# Patient Record
Sex: MALE | Race: WHITE | NOT HISPANIC OR LATINO | Employment: FULL TIME | URBAN - METROPOLITAN AREA
[De-identification: names, ages, dates, MRNs, and addresses within clinical notes are randomized per-mention and may not be internally consistent; named-entity substitution may affect disease eponyms.]

---

## 2017-06-10 ENCOUNTER — OFFICE VISIT (OUTPATIENT)
Dept: RETAIL CLINIC | Facility: CLINIC | Age: 22
End: 2017-06-10

## 2017-06-10 VITALS
RESPIRATION RATE: 18 BRPM | HEART RATE: 80 BPM | OXYGEN SATURATION: 98 % | TEMPERATURE: 98 F | SYSTOLIC BLOOD PRESSURE: 120 MMHG | DIASTOLIC BLOOD PRESSURE: 80 MMHG

## 2017-06-10 DIAGNOSIS — M54.6 RIGHT-SIDED THORACIC BACK PAIN, UNSPECIFIED CHRONICITY: ICD-10-CM

## 2017-06-10 DIAGNOSIS — M54.6 ACUTE RIGHT-SIDED THORACIC BACK PAIN: Primary | ICD-10-CM

## 2017-06-10 PROCEDURE — 99213 OFFICE O/P EST LOW 20 MIN: CPT | Performed by: NURSE PRACTITIONER

## 2017-06-10 RX ORDER — NAPROXEN 500 MG/1
500 TABLET ORAL 2 TIMES DAILY WITH MEALS
Qty: 10 TABLET | Refills: 0 | Status: SHIPPED | OUTPATIENT
Start: 2017-06-10 | End: 2020-10-16 | Stop reason: SDUPTHER

## 2017-06-10 RX ORDER — PREDNISONE 10 MG/1
TABLET ORAL
Qty: 21 TABLET | Refills: 0 | Status: SHIPPED | OUTPATIENT
Start: 2017-06-10 | End: 2020-10-16 | Stop reason: SDUPTHER

## 2017-06-10 NOTE — PATIENT INSTRUCTIONS
Back Pain, Adult  Back pain is very common in adults. The cause of back pain is rarely dangerous and the pain often gets better over time. The cause of your back pain may not be known. Some common causes of back pain include:  · Strain of the muscles or ligaments supporting the spine.  · Wear and tear (degeneration) of the spinal disks.  · Arthritis.  · Direct injury to the back.  For many people, back pain may return. Since back pain is rarely dangerous, most people can learn to manage this condition on their own.  HOME CARE INSTRUCTIONS  Watch your back pain for any changes. The following actions may help to lessen any discomfort you are feeling:  · Remain active. It is stressful on your back to sit or  one place for long periods of time. Do not sit, drive, or  one place for more than 30 minutes at a time. Take short walks on even surfaces as soon as you are able. Try to increase the length of time you walk each day.  · Exercise regularly as directed by your health care provider. Exercise helps your back heal faster. It also helps avoid future injury by keeping your muscles strong and flexible.  · Do not stay in bed. Resting more than 1-2 days can delay your recovery.  · Pay attention to your body when you bend and lift. The most comfortable positions are those that put less stress on your recovering back. Always use proper lifting techniques, including:    Bending your knees.    Keeping the load close to your body.    Avoiding twisting.  · Find a comfortable position to sleep. Use a firm mattress and lie on your side with your knees slightly bent. If you lie on your back, put a pillow under your knees.  · Avoid feeling anxious or stressed. Stress increases muscle tension and can worsen back pain. It is important to recognize when you are anxious or stressed and learn ways to manage it, such as with exercise.  · Take medicines only as directed by your health care provider. Over-the-counter  medicines to reduce pain and inflammation are often the most helpful. Your health care provider may prescribe muscle relaxant drugs. These medicines help dull your pain so you can more quickly return to your normal activities and healthy exercise.  · Apply ice to the injured area:    Put ice in a plastic bag.    Place a towel between your skin and the bag.    Leave the ice on for 20 minutes, 2-3 times a day for the first 2-3 days. After that, ice and heat may be alternated to reduce pain and spasms.  · Maintain a healthy weight. Excess weight puts extra stress on your back and makes it difficult to maintain good posture.  SEEK MEDICAL CARE IF:  · You have pain that is not relieved with rest or medicine.  · You have increasing pain going down into the legs or buttocks.  · You have pain that does not improve in one week.  · You have night pain.  · You lose weight.  · You have a fever or chills.  SEEK IMMEDIATE MEDICAL CARE IF:   · You develop new bowel or bladder control problems.  · You have unusual weakness or numbness in your arms or legs.  · You develop nausea or vomiting.  · You develop abdominal pain.  · You feel faint.     This information is not intended to replace advice given to you by your health care provider. Make sure you discuss any questions you have with your health care provider.     Document Released: 12/18/2006 Document Revised: 01/08/2016 Document Reviewed: 04/21/2015  [employer] Patient Education ©2017 [employer]  Talked to the patient about the diagnosis and educate the patient and advise to visit to PCP if the symptoms worsens

## 2017-06-10 NOTE — PROGRESS NOTES
Subjective   Liu Meier is a 22 y.o. male.     Back Pain   This is a new problem. Episode onset: 2 days  The problem occurs constantly. The problem has been gradually worsening since onset. The pain is present in the thoracic spine. The quality of the pain is described as aching and shooting. The pain does not radiate. The pain is at a severity of 7/10. The pain is moderate. The symptoms are aggravated by coughing, twisting and bending. Stiffness is present all day. Pertinent negatives include no bladder incontinence, bowel incontinence, fever, headaches, leg pain, paresthesias, perianal numbness, weakness or weight loss. He has tried NSAIDs for the symptoms. The treatment provided mild relief.        The following portions of the patient's history were reviewed and updated as appropriate: allergies, current medications, past family history, past medical history, past social history, past surgical history and problem list.    Review of Systems   Constitutional: Negative.  Negative for fever and weight loss.   HENT: Negative.    Eyes: Negative.    Respiratory: Negative.    Cardiovascular: Negative.    Gastrointestinal: Negative.  Negative for bowel incontinence.   Genitourinary: Negative for bladder incontinence.   Musculoskeletal: Positive for back pain and myalgias. Negative for gait problem and neck pain.        Right side mid back pain does not radiate   Skin: Negative.    Neurological: Negative for weakness, headaches and paresthesias.       Objective   Physical Exam   Constitutional: He is oriented to person, place, and time. He appears well-developed and well-nourished.   Eyes: Pupils are equal, round, and reactive to light.   Neck: Normal range of motion.   Cardiovascular: Normal rate and regular rhythm.    Pulmonary/Chest: Effort normal and breath sounds normal.   Musculoskeletal: He exhibits edema and tenderness.   Right side mid thoracic tenderness    Neurological: He is oriented to person, place, and  time.   Nursing note and vitals reviewed.      Assessment/Plan   Liu was seen today for back pain.    Diagnoses and all orders for this visit:    Acute right-sided thoracic back pain  -     naproxen (NAPROSYN) 500 MG tablet; Take 1 tablet by mouth 2 (Two) Times a Day With Meals.  -     predniSONE (DELTASONE) 10 MG tablet; 10 mg pack    Right-sided thoracic back pain, unspecified chronicity  -     naproxen (NAPROSYN) 500 MG tablet; Take 1 tablet by mouth 2 (Two) Times a Day With Meals.  -     predniSONE (DELTASONE) 10 MG tablet; 10 mg pack    Talked to the patient about the diagnosis and educate the patient and advise to visit to PCP for the xray and further evaluation if the symptoms worsens

## 2019-10-01 ENCOUNTER — HOSPITAL ENCOUNTER (EMERGENCY)
Facility: HOSPITAL | Age: 24
Discharge: HOME | End: 2019-10-01
Attending: EMERGENCY MEDICINE | Admitting: EMERGENCY MEDICINE

## 2019-10-01 VITALS
TEMPERATURE: 98.3 F | OXYGEN SATURATION: 98 % | WEIGHT: 145.72 LBS | HEART RATE: 89 BPM | DIASTOLIC BLOOD PRESSURE: 72 MMHG | RESPIRATION RATE: 15 BRPM | BODY MASS INDEX: 20.86 KG/M2 | HEIGHT: 70 IN | SYSTOLIC BLOOD PRESSURE: 124 MMHG

## 2019-10-01 DIAGNOSIS — R21 RASH: ICD-10-CM

## 2019-10-01 DIAGNOSIS — R74.8 ABNORMAL LIVER ENZYMES: ICD-10-CM

## 2019-10-01 DIAGNOSIS — B27.90 INFECTIOUS MONONUCLEOSIS WITHOUT COMPLICATION, INFECTIOUS MONONUCLEOSIS DUE TO UNSPECIFIED ORGANISM: Primary | ICD-10-CM

## 2019-10-01 LAB
ALBUMIN SERPL-MCNC: 3.8 G/DL (ref 3.5–4.8)
ALBUMIN/GLOB SERPL: 1.3 G/DL (ref 1–1.7)
ALP SERPL-CCNC: 125 U/L (ref 32–91)
ALT SERPL W P-5'-P-CCNC: 469 U/L (ref 17–63)
ANION GAP SERPL CALCULATED.3IONS-SCNC: 12.6 MMOL/L (ref 5–15)
AST SERPL-CCNC: 173 U/L (ref 15–41)
BASOPHILS # BLD AUTO: 0 10*3/MM3 (ref 0–0.2)
BASOPHILS NFR BLD AUTO: 0.3 % (ref 0–1.5)
BILIRUB SERPL-MCNC: 0.7 MG/DL (ref 0.3–1.2)
BUN BLD-MCNC: 9 MG/DL (ref 8–20)
BUN/CREAT SERPL: 8.2 (ref 6.2–20.3)
CALCIUM SPEC-SCNC: 8.6 MG/DL (ref 8.9–10.3)
CHLORIDE SERPL-SCNC: 103 MMOL/L (ref 101–111)
CO2 SERPL-SCNC: 28 MMOL/L (ref 22–32)
CREAT BLD-MCNC: 1.1 MG/DL (ref 0.7–1.2)
DEPRECATED RDW RBC AUTO: 41.6 FL (ref 37–54)
EOSINOPHIL # BLD AUTO: 0.3 10*3/MM3 (ref 0–0.4)
EOSINOPHIL NFR BLD AUTO: 3 % (ref 0.3–6.2)
ERYTHROCYTE [DISTWIDTH] IN BLOOD BY AUTOMATED COUNT: 13 % (ref 12.3–15.4)
GFR SERPL CREATININE-BSD FRML MDRD: 83 ML/MIN/1.73
GLOBULIN UR ELPH-MCNC: 2.9 GM/DL (ref 2.5–3.8)
GLUCOSE BLD-MCNC: 97 MG/DL (ref 65–99)
HCT VFR BLD AUTO: 42.6 % (ref 37.5–51)
HETEROPH AB SER QL LA: POSITIVE
HGB BLD-MCNC: 14.4 G/DL (ref 13–17.7)
LYMPHOCYTES # BLD AUTO: 3.8 10*3/MM3 (ref 0.7–3.1)
LYMPHOCYTES NFR BLD AUTO: 43.7 % (ref 19.6–45.3)
MCH RBC QN AUTO: 30.3 PG (ref 26.6–33)
MCHC RBC AUTO-ENTMCNC: 33.9 G/DL (ref 31.5–35.7)
MCV RBC AUTO: 89.5 FL (ref 79–97)
MONOCYTES # BLD AUTO: 1.1 10*3/MM3 (ref 0.1–0.9)
MONOCYTES NFR BLD AUTO: 12.6 % (ref 5–12)
NEUTROPHILS # BLD AUTO: 3.5 10*3/MM3 (ref 1.7–7)
NEUTROPHILS NFR BLD AUTO: 40.4 % (ref 42.7–76)
NRBC BLD AUTO-RTO: 0.1 /100 WBC (ref 0–0.2)
PLATELET # BLD AUTO: 350 10*3/MM3 (ref 140–450)
PMV BLD AUTO: 7 FL (ref 6–12)
POTASSIUM BLD-SCNC: 3.6 MMOL/L (ref 3.6–5.1)
PROT SERPL-MCNC: 6.7 G/DL (ref 6.1–7.9)
RBC # BLD AUTO: 4.76 10*6/MM3 (ref 4.14–5.8)
S PYO AG THROAT QL: NEGATIVE
SODIUM BLD-SCNC: 140 MMOL/L (ref 136–144)
WBC NRBC COR # BLD: 8.8 10*3/MM3 (ref 3.4–10.8)

## 2019-10-01 PROCEDURE — 80053 COMPREHEN METABOLIC PANEL: CPT | Performed by: EMERGENCY MEDICINE

## 2019-10-01 PROCEDURE — 87651 STREP A DNA AMP PROBE: CPT | Performed by: EMERGENCY MEDICINE

## 2019-10-01 PROCEDURE — 99283 EMERGENCY DEPT VISIT LOW MDM: CPT

## 2019-10-01 PROCEDURE — 85025 COMPLETE CBC W/AUTO DIFF WBC: CPT | Performed by: EMERGENCY MEDICINE

## 2019-10-01 PROCEDURE — 86308 HETEROPHILE ANTIBODY SCREEN: CPT | Performed by: EMERGENCY MEDICINE

## 2019-10-01 PROCEDURE — 25010000002 DIPHENHYDRAMINE PER 50 MG: Performed by: EMERGENCY MEDICINE

## 2019-10-01 PROCEDURE — 96375 TX/PRO/DX INJ NEW DRUG ADDON: CPT

## 2019-10-01 PROCEDURE — 25010000002 METHYLPREDNISOLONE PER 125 MG: Performed by: EMERGENCY MEDICINE

## 2019-10-01 PROCEDURE — 96374 THER/PROPH/DIAG INJ IV PUSH: CPT

## 2019-10-01 RX ORDER — SODIUM CHLORIDE 0.9 % (FLUSH) 0.9 %
10 SYRINGE (ML) INJECTION AS NEEDED
Status: DISCONTINUED | OUTPATIENT
Start: 2019-10-01 | End: 2019-10-01 | Stop reason: HOSPADM

## 2019-10-01 RX ORDER — DIPHENHYDRAMINE HYDROCHLORIDE 50 MG/ML
25 INJECTION INTRAMUSCULAR; INTRAVENOUS ONCE
Status: COMPLETED | OUTPATIENT
Start: 2019-10-01 | End: 2019-10-01

## 2019-10-01 RX ORDER — METHYLPREDNISOLONE SODIUM SUCCINATE 125 MG/2ML
125 INJECTION, POWDER, LYOPHILIZED, FOR SOLUTION INTRAMUSCULAR; INTRAVENOUS ONCE
Status: COMPLETED | OUTPATIENT
Start: 2019-10-01 | End: 2019-10-01

## 2019-10-01 RX ADMIN — DIPHENHYDRAMINE HYDROCHLORIDE 25 MG: 50 INJECTION, SOLUTION INTRAMUSCULAR; INTRAVENOUS at 02:41

## 2019-10-01 RX ADMIN — METHYLPREDNISOLONE SODIUM SUCCINATE 125 MG: 125 INJECTION, POWDER, FOR SOLUTION INTRAMUSCULAR; INTRAVENOUS at 02:40

## 2019-10-01 NOTE — ED PROVIDER NOTES
"Subjective   History of Present Illness  Rash  24-year-old male states he developed a rash over 24 hours ago that has increased tonight.  He reports no tongue or throat swelling or shortness of breath or fevers or chills.  He states he was seen on Friday at an urgent care center for sore throat and was prescribed amoxicillin and prednisone.  He states he was taking the prednisone over the last couple days and started having the rash.  He states he just started the antibiotic today after the rash had already developed.  He states his sore throat is better.  Review of Systems   Constitutional: Negative.  Negative for fever.   HENT: Positive for sore throat.    Respiratory: Negative.    Cardiovascular: Negative.    Gastrointestinal: Negative.    Skin: Positive for rash.   Neurological: Negative.    Hematological: Negative.    Psychiatric/Behavioral: Negative.        Past Medical History:   Diagnosis Date   • Allergic    • Heartburn        No Known Allergies    No past surgical history on file.    Family History   Problem Relation Age of Onset   • Heart disease Paternal Grandfather    • No Known Problems Mother    • No Known Problems Father        Social History     Socioeconomic History   • Marital status: Single     Spouse name: Not on file   • Number of children: Not on file   • Years of education: Not on file   • Highest education level: Not on file   Tobacco Use   • Smoking status: Never Smoker   • Smokeless tobacco: Never Used   Substance and Sexual Activity   • Alcohol use: Yes   • Drug use: No   • Sexual activity: Defer           Objective   Physical Exam  /79 (Patient Position: Sitting)   Pulse 100   Temp 98.2 °F (36.8 °C) (Oral)   Resp 18   Ht 177.8 cm (70\")   Wt 66.1 kg (145 lb 11.6 oz)   SpO2 100%   BMI 20.91 kg/m²   General: Well-developed well-appearing, no acute distress, alert and appropriate  Eyes: Pupils round and reactive, sclera nonicteric  HEENT: Mucous membranes moist, no mucosal " swelling  Neck: Supple, no nuchal rigidity, no lymphadenopathy  Respirations: Respirations nonlabored, equal breath sounds bilaterally, clear lungs  Heart regular rate and rhythm, no murmurs rubs or gallops,   Abdomen soft nontender nondistended, no hepatosplenomegaly,  Extremities no clubbing cyanosis or edema, calves are symmetric and nontender  Neuro cranial nerves grossly intact, no focal limb deficits  Psych oriented, pleasant affect  Skin blanchable, maculopapular eruption on his trunk, no petechiae or purpura, no vesicles or pustules, no mucosal involvement, no skin breakdown  Procedures           ED Course      Results for orders placed or performed during the hospital encounter of 10/01/19   Rapid Strep A Screen - Swab, Throat   Result Value Ref Range    Strep A Ag Negative Negative   Mononucleosis Screen   Result Value Ref Range    Monospot Positive (A) Negative   Comprehensive Metabolic Panel   Result Value Ref Range    Glucose 97 65 - 99 mg/dL    BUN 9 8 - 20 mg/dL    Creatinine 1.10 0.70 - 1.20 mg/dL    Sodium 140 136 - 144 mmol/L    Potassium 3.6 3.6 - 5.1 mmol/L    Chloride 103 101 - 111 mmol/L    CO2 28.0 22.0 - 32.0 mmol/L    Calcium 8.6 (L) 8.9 - 10.3 mg/dL    Total Protein 6.7 6.1 - 7.9 g/dL    Albumin 3.80 3.50 - 4.80 g/dL    ALT (SGPT) 469 (H) 17 - 63 U/L    AST (SGOT) 173 (H) 15 - 41 U/L    Alkaline Phosphatase 125 (H) 32 - 91 U/L    Total Bilirubin 0.7 0.3 - 1.2 mg/dL    eGFR Non African Amer 83 >60 mL/min/1.73    Globulin 2.9 2.5 - 3.8 gm/dL    A/G Ratio 1.3 1.0 - 1.7 g/dL    BUN/Creatinine Ratio 8.2 6.2 - 20.3    Anion Gap 12.6 5.0 - 15.0 mmol/L   CBC Auto Differential   Result Value Ref Range    WBC 8.80 3.40 - 10.80 10*3/mm3    RBC 4.76 4.14 - 5.80 10*6/mm3    Hemoglobin 14.4 13.0 - 17.7 g/dL    Hematocrit 42.6 37.5 - 51.0 %    MCV 89.5 79.0 - 97.0 fL    MCH 30.3 26.6 - 33.0 pg    MCHC 33.9 31.5 - 35.7 g/dL    RDW 13.0 12.3 - 15.4 %    RDW-SD 41.6 37.0 - 54.0 fl    MPV 7.0 6.0 - 12.0 fL     Platelets 350 140 - 450 10*3/mm3    Neutrophil % 40.4 (L) 42.7 - 76.0 %    Lymphocyte % 43.7 19.6 - 45.3 %    Monocyte % 12.6 (H) 5.0 - 12.0 %    Eosinophil % 3.0 0.3 - 6.2 %    Basophil % 0.3 0.0 - 1.5 %    Neutrophils, Absolute 3.50 1.70 - 7.00 10*3/mm3    Lymphocytes, Absolute 3.80 (H) 0.70 - 3.10 10*3/mm3    Monocytes, Absolute 1.10 (H) 0.10 - 0.90 10*3/mm3    Eosinophils, Absolute 0.30 0.00 - 0.40 10*3/mm3    Basophils, Absolute 0.00 0.00 - 0.20 10*3/mm3    nRBC 0.1 0.0 - 0.2 /100 WBC                 MDM  Patient presented with a rash following a sore throat.  Sore throat is now resolved.  He did test positive for mono.  He is well-appearing and has no abdominal tenderness he does have some elevated liver enzymes.  Was advised these findings he is advised to stop the antibiotic.  He was given warning signs for return and discharged in good condition.  He was given IV fluids during emergency room course.  He is nontoxic-appearing.  Final diagnoses:   Infectious mononucleosis without complication, infectious mononucleosis due to unspecified organism   Rash   Abnormal liver enzymes              Sheng Wahl MD  10/01/19 0324

## 2019-10-01 NOTE — DISCHARGE INSTRUCTIONS
Avoid Tylenol and alcohol.  Rest, drink plenty of fluids.  Avoid contact sports or strenuous physical exertion.    Return for increased pain fever vomiting, shortness of air or any other concerns. See your doctor this week for follow up.

## 2020-10-16 ENCOUNTER — OFFICE VISIT (OUTPATIENT)
Dept: INTERNAL MEDICINE | Facility: CLINIC | Age: 25
End: 2020-10-16

## 2020-10-16 VITALS
HEIGHT: 70 IN | WEIGHT: 145 LBS | BODY MASS INDEX: 20.76 KG/M2 | DIASTOLIC BLOOD PRESSURE: 60 MMHG | SYSTOLIC BLOOD PRESSURE: 110 MMHG

## 2020-10-16 DIAGNOSIS — Z00.00 HEALTH MAINTENANCE EXAMINATION: Primary | ICD-10-CM

## 2020-10-16 DIAGNOSIS — G89.29 CHRONIC LEFT-SIDED THORACIC BACK PAIN: ICD-10-CM

## 2020-10-16 DIAGNOSIS — E55.9 VITAMIN D DEFICIENCY: ICD-10-CM

## 2020-10-16 DIAGNOSIS — M54.6 CHRONIC LEFT-SIDED THORACIC BACK PAIN: ICD-10-CM

## 2020-10-16 PROCEDURE — 99395 PREV VISIT EST AGE 18-39: CPT | Performed by: INTERNAL MEDICINE

## 2020-10-16 NOTE — PROGRESS NOTES
"Subjective   Liu Meier is a 25 y.o. male.     Chief Complaint   Patient presents with   • Annual Exam       History of Present Illness   This is a 25-year-old gentleman who presents for a health maintenance exam as well as a follow-up on labs.  The patient states that he often has muscle spasms in his back that affected the \"entire back\", more recently he has been having some left mid thoracic back pain.  He is currently in the  service[employer] and is required to do an exam and evaluation each year, the patient states that the only activity that seems to exacerbate these muscle spasms is performing a 2 mile run, he presents today with paperwork that needs to be completed for the [employer] [facility] in regards to these issues.    The following portions of the patient's history were reviewed and updated as appropriate: allergies, current medications, past family history, past medical history, past social history, past surgical history and problem list.    Depression Screen:  PHQ-2/PHQ-9 Depression Screening 10/16/2020   Little interest or pleasure in doing things 1   Feeling down, depressed, or hopeless 1   Trouble falling or staying asleep, or sleeping too much 2   Feeling tired or having little energy 2   Poor appetite or overeating 0   Feeling bad about yourself - or that you are a failure or have let yourself or your family down 0   Trouble concentrating on things, such as reading the newspaper or watching television 1   Moving or speaking so slowly that other people could have noticed. Or the opposite - being so fidgety or restless that you have been moving around a lot more than usual 0   Thoughts that you would be better off dead, or of hurting yourself in some way 0   Total Score 7   If you checked off any problems, how difficult have these problems made it for you to do your work, take care of things at home, or get along with other people? Somewhat difficult " "      Assessment/Plan   Diagnoses and all orders for this visit:    1. Health maintenance examination (Primary)    2. Vitamin D deficiency    3. Chronic left-sided thoracic back pain  -     Ambulatory Referral to Physical Therapy Evaluate and treat (Needs home exercise program)         #1.  Health maintenance exam-exam completed, labs reviewed with the patient.  LDL goal less than 130, total cholesterol 166, HDL 49, triglycerides 47 and , recommend continued diet and exercise.  2.  Vitamin D deficiency-level is 32, recommend continue multivitamin or consider adding a vitamin D3 1000 international units daily.  3.  Thoracic back pain-chronic-mid left-recommend physical therapy evaluation and treatment, specifically obtaining home exercise program.    Past Medical History:   Diagnosis Date   • Allergic    • Heartburn        History reviewed. No pertinent surgical history.    Family History   Problem Relation Age of Onset   • Heart disease Paternal Grandfather    • No Known Problems Mother    • No Known Problems Father        Social History     Socioeconomic History   • Marital status: Single     Spouse name: Not on file   • Number of children: Not on file   • Years of education: Not on file   • Highest education level: Not on file   Tobacco Use   • Smoking status: Never Smoker   • Smokeless tobacco: Never Used   Substance and Sexual Activity   • Alcohol use: Yes   • Drug use: No   • Sexual activity: Defer       No current outpatient medications on file.     No current facility-administered medications for this visit.        Review of Systems    Objective   /60   Ht 177.8 cm (70\")   Wt 65.8 kg (145 lb)   BMI 20.81 kg/m²     Physical Exam  Constitutional:  The patient appears well developed and well nourished and is in no acute distress.  Head & Face:  Head and face are symmetric, normocephalic and atraumatic.  Palpation of the face and sinuses show them to be non-tender and without masses.  Eyes:  " Inspection of the conjunctiva and lids reveal no swelling, erythema or discharge.  The pupils are equal, round and reactive to light.  Fundoscopic exam of the eyes reveals normal fundi and optic discs.  Ears, Nose, Mouth & Throat:  On inspecton, the ears and nose are within normal limits.  Otoscopic exam reveals tympanic membranes are translucent with normal light reflex.  Canals are patent without erythema.  Lips, teeth, and gums appear healthy with good dentition.  The oropharynx is normal with no erythema, edema, exudate or lesions.  Neck:  The neck was normal in appearance and supple.  The trachea was midline.  Exam of the thyroid reveals no thromegaly or masses.  Pulmonary:  Respiratory effort is normal without evidence of respiratory distress.  Lungs are clear to auscultation bilaterally.  Cardiovascular:  The apical impulse was normal,  The heart rate was normal,  The rhythm was regular.  Hearts sounds reveal a normal S1, a normal S2, no gallops, rubs or murmurs.    Extremities:  Pedal pulses - Dorsalis Pedis/Posterior Tibialis are 2+/4 bilaterally.  Examination of the extremities show no edema.   Abdomen:  The abdomen is soft, nontender and without masses.  Bowel sounds are positive in all four quadrants.  There is no palpable hepatomegaly or splenomegaly.  Lymphatic:  There is no palpable lymphadenopathy appreciated of neck, axilla or inguinal regions.  Musculoskeletal: Gait and station are within normal limits.  Skin:  Skin and subcutaneous tissues are normal and without rashes or lesions.   Neurologic:  Cranial nerves II-XII are intact.  Reflexes are 2+ and symmetric.  Patient demonstrates no sensory loss.  Psychiatric:  Patient's judgement and insight are unimpaired.  Patient is oriented to person, time and place.  Patient's mood and affect are normal.      Recent Results (from the past 2016 hour(s))   Comprehensive Metabolic Panel    Collection Time: 10/09/20  9:40 AM    Specimen: Blood   Result Value  Ref Range    Glucose 78 65 - 99 mg/dL    BUN 17 6 - 20 mg/dL    Creatinine 0.99 0.76 - 1.27 mg/dL    eGFR Non African Am 106 >59 mL/min/1.73    eGFR African Am 123 >59 mL/min/1.73    BUN/Creatinine Ratio 17 9 - 20    Sodium 144 134 - 144 mmol/L    Potassium 4.9 3.5 - 5.2 mmol/L    Chloride 99 96 - 106 mmol/L    Total CO2 26 20 - 29 mmol/L    Calcium 10.2 8.7 - 10.2 mg/dL    Total Protein 8.0 6.0 - 8.5 g/dL    Albumin 5.4 (H) 4.1 - 5.2 g/dL    Globulin 2.6 1.5 - 4.5 g/dL    A/G Ratio 2.1 1.2 - 2.2    Total Bilirubin 1.2 0.0 - 1.2 mg/dL    Alkaline Phosphatase 59 39 - 117 IU/L    AST (SGOT) 15 0 - 40 IU/L    ALT (SGPT) 10 0 - 44 IU/L   Lipid Panel With LDL / HDL Ratio    Collection Time: 10/09/20  9:40 AM    Specimen: Blood   Result Value Ref Range    Total Cholesterol 166 100 - 199 mg/dL    Triglycerides 47 0 - 149 mg/dL    HDL Cholesterol 49 >39 mg/dL    VLDL Cholesterol Caden 10 5 - 40 mg/dL    LDL Chol Calc (NIH) 107 (H) 0 - 99 mg/dL    LDL/HDL RATIO 2.2 0.0 - 3.6 ratio   Vitamin D 25 Hydroxy    Collection Time: 10/09/20  9:40 AM    Specimen: Blood   Result Value Ref Range    25 Hydroxy, Vitamin D 32.0 30.0 - 100.0 ng/mL   CBC & Differential    Collection Time: 10/09/20  9:40 AM    Specimen: Blood   Result Value Ref Range    WBC 8.1 3.4 - 10.8 x10E3/uL    RBC 5.36 4.14 - 5.80 x10E6/uL    Hemoglobin 16.4 13.0 - 17.7 g/dL    Hematocrit 48.9 37.5 - 51.0 %    MCV 91 79 - 97 fL    MCH 30.6 26.6 - 33.0 pg    MCHC 33.5 31.5 - 35.7 g/dL    RDW 12.0 11.6 - 15.4 %    Platelets 334 150 - 450 x10E3/uL    Neutrophil Rel % 63 Not Estab. %    Lymphocyte Rel % 24 Not Estab. %    Monocyte Rel % 9 Not Estab. %    Eosinophil Rel % 3 Not Estab. %    Basophil Rel % 1 Not Estab. %    Neutrophils Absolute 5.1 1.4 - 7.0 x10E3/uL    Lymphocytes Absolute 2.0 0.7 - 3.1 x10E3/uL    Monocytes Absolute 0.7 0.1 - 0.9 x10E3/uL    Eosinophils Absolute 0.3 0.0 - 0.4 x10E3/uL    Basophils Absolute 0.0 0.0 - 0.2 x10E3/uL    Immature Granulocyte Rel %  0 Not Estab. %    Immature Grans Absolute 0.0 0.0 - 0.1 x10E3/uL